# Patient Record
Sex: MALE | Race: WHITE
[De-identification: names, ages, dates, MRNs, and addresses within clinical notes are randomized per-mention and may not be internally consistent; named-entity substitution may affect disease eponyms.]

---

## 2019-04-28 ENCOUNTER — HOSPITAL ENCOUNTER (EMERGENCY)
Dept: HOSPITAL 10 - E/R | Age: 24
Discharge: HOME | End: 2019-04-28
Payer: SELF-PAY

## 2019-04-28 ENCOUNTER — HOSPITAL ENCOUNTER (EMERGENCY)
Dept: HOSPITAL 91 - E/R | Age: 24
Discharge: HOME | End: 2019-04-28
Payer: SELF-PAY

## 2019-04-28 VITALS
HEIGHT: 65 IN | HEIGHT: 65 IN | BODY MASS INDEX: 31.72 KG/M2 | WEIGHT: 190.39 LBS | WEIGHT: 190.39 LBS | BODY MASS INDEX: 31.72 KG/M2

## 2019-04-28 VITALS — HEART RATE: 65 BPM | RESPIRATION RATE: 16 BRPM | DIASTOLIC BLOOD PRESSURE: 60 MMHG | SYSTOLIC BLOOD PRESSURE: 123 MMHG
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
## 2019-04-28 DIAGNOSIS — S13.9XXA: ICD-10-CM
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
## 2019-04-28 DIAGNOSIS — R51: ICD-10-CM

## 2019-04-28 DIAGNOSIS — S00.83XA: ICD-10-CM

## 2019-04-28 DIAGNOSIS — S06.0X1A: Primary | ICD-10-CM

## 2019-04-28 LAB
ADD MAN DIFF?: NO
ALANINE AMINOTRANSFERASE: 29 IU/L (ref 13–69)
ALBUMIN/GLOBULIN RATIO: 1.43
ALBUMIN: 4.6 G/DL (ref 3.3–4.9)
ALKALINE PHOSPHATASE: 95 IU/L (ref 42–121)
ANION GAP: 9 (ref 5–13)
ASPARTATE AMINO TRANSFERASE: 30 IU/L (ref 15–46)
BASOPHIL #: 0 10^3/UL (ref 0–0.1)
BASOPHILS %: 0.3 % (ref 0–2)
BILIRUBIN,DIRECT: 0 MG/DL (ref 0–0.2)
BILIRUBIN,TOTAL: 0.5 MG/DL (ref 0.2–1.3)
BLOOD UREA NITROGEN: 13 MG/DL (ref 7–20)
CALCIUM: 9.8 MG/DL (ref 8.4–10.2)
CARBON DIOXIDE: 31 MMOL/L (ref 21–31)
CHLORIDE: 102 MMOL/L (ref 97–110)
CREATININE: 0.78 MG/DL (ref 0.61–1.24)
EOSINOPHILS #: 0.1 10^3/UL (ref 0–0.5)
EOSINOPHILS %: 2.4 % (ref 0–7)
GLOBULIN: 3.2 G/DL (ref 1.3–3.2)
GLUCOSE: 94 MG/DL (ref 70–220)
HEMATOCRIT: 45.2 % (ref 42–52)
HEMOGLOBIN: 15.7 G/DL (ref 14–18)
IMMATURE GRANS #M: 0.02 10^3/UL (ref 0–0.03)
IMMATURE GRANS % (M): 0.3 % (ref 0–0.43)
INR: 0.89
LYMPHOCYTES #: 1.6 10^3/UL (ref 0.8–2.9)
LYMPHOCYTES %: 27.9 % (ref 15–51)
MEAN CORPUSCULAR HEMOGLOBIN: 30 PG (ref 29–33)
MEAN CORPUSCULAR HGB CONC: 34.7 G/DL (ref 32–37)
MEAN CORPUSCULAR VOLUME: 86.3 FL (ref 82–101)
MEAN PLATELET VOLUME: 9.6 FL (ref 7.4–10.4)
MONOCYTE #: 0.5 10^3/UL (ref 0.3–0.9)
MONOCYTES %: 7.7 % (ref 0–11)
NEUTROPHIL #: 3.6 10^3/UL (ref 1.6–7.5)
NEUTROPHILS %: 61.4 % (ref 39–77)
NUCLEATED RED BLOOD CELLS #: 0 10^3/UL (ref 0–0)
NUCLEATED RED BLOOD CELLS%: 0 /100WBC (ref 0–0)
PARTIAL THROMBOPLASTIN TIME: 27.6 SEC (ref 23–35)
PLATELET COUNT: 230 10^3/UL (ref 140–415)
POTASSIUM: 4.2 MMOL/L (ref 3.5–5.1)
PROTIME: 12.2 SEC (ref 11.9–14.9)
PT RATIO: 1
RED BLOOD COUNT: 5.24 10^6/UL (ref 4.7–6.1)
RED CELL DISTRIBUTION WIDTH: 11.5 % (ref 11.5–14.5)
SODIUM: 142 MMOL/L (ref 135–144)
TOTAL PROTEIN: 7.8 G/DL (ref 6.1–8.1)
WHITE BLOOD COUNT: 5.9 10^3/UL (ref 4.8–10.8)

## 2019-04-28 PROCEDURE — 72125 CT NECK SPINE W/O DYE: CPT

## 2019-04-28 PROCEDURE — 70450 CT HEAD/BRAIN W/O DYE: CPT

## 2019-04-28 PROCEDURE — 96375 TX/PRO/DX INJ NEW DRUG ADDON: CPT

## 2019-04-28 PROCEDURE — 99285 EMERGENCY DEPT VISIT HI MDM: CPT

## 2019-04-28 PROCEDURE — 85610 PROTHROMBIN TIME: CPT

## 2019-04-28 PROCEDURE — 85025 COMPLETE CBC W/AUTO DIFF WBC: CPT

## 2019-04-28 PROCEDURE — 85730 THROMBOPLASTIN TIME PARTIAL: CPT

## 2019-04-28 PROCEDURE — 70486 CT MAXILLOFACIAL W/O DYE: CPT

## 2019-04-28 PROCEDURE — 96374 THER/PROPH/DIAG INJ IV PUSH: CPT

## 2019-04-28 PROCEDURE — 80053 COMPREHEN METABOLIC PANEL: CPT

## 2019-04-28 RX ADMIN — THIAMINE HYDROCHLORIDE 1 MLS/HR: 100 INJECTION, SOLUTION INTRAMUSCULAR; INTRAVENOUS at 13:27

## 2019-04-28 RX ADMIN — HYDROMORPHONE HYDROCHLORIDE 1 MG: 2 INJECTION, SOLUTION INTRAMUSCULAR; INTRAVENOUS; SUBCUTANEOUS at 13:27

## 2019-04-28 RX ADMIN — KETOROLAC TROMETHAMINE 1 MG: 30 INJECTION, SOLUTION INTRAMUSCULAR at 14:20

## 2019-04-28 RX ADMIN — ONDANSETRON HYDROCHLORIDE 1 MG: 2 INJECTION, SOLUTION INTRAMUSCULAR; INTRAVENOUS at 13:27

## 2019-04-28 NOTE — ERD
ER Documentation


Chief Complaint


Chief Complaint





HEAD INJURY DURING SOCCER GAME AFTER GETTING HIT WITH KNEE





HPI


This is a 23-year-old male with no past medical history that was brought into 


the emergency department by EMS after he experienced blunt head trauma just 


prior to arrival.  The patient was playing soccer and stated the last thing he 


remembers is diving to hit the ball.  EMS indicated the  stated the patient


had hit the left side of his head against another player.  He had a brief 


transient loss of consciousness for roughly 20 seconds.  The patient is 


complaining of a severe headache on the left side and pain over his left cheek. 


He denies any changes in vision.  He is not nauseous.  He did not experience any


emesis.  He states the headache is 10 out of 10 in intensity.  He denies any 


numbness or tingling of his upper or lower extremities.  He denies any abdominal


pain.





ROS


All systems reviewed and are negative except as per history of present illness.





Medications


Home Meds


Active Scripts


Hydrocodone/Acetaminophen (Norco 5-325 Tablet) 1 Each Tablet, 1 TAB PO Q6H PRN 


for PAIN, #20 TAB


   Prov:PERRI JO MD         4/28/19


Ibuprofen* (Motrin*) 800 Mg Tab, 800 MG PO Q6H PRN for PAIN AND OR ELEVATED 


TEMP, #30 TAB


   Prov:PERRI JO MD         4/28/19





Allergies


Allergies:  


Coded Allergies:  


     No Known Drug Allergies (Verified  Allergy, Unknown, 4/28/19)





PMhx/Soc


Medical and Surgical Hx:  pt denies Medical Hx, pt denies Surgical Hx


Smoking Status:  Never smoker





Physical Exam


Vitals





Vital Signs


  Date      Temp  Pulse  Resp  B/P (MAP)   Pulse Ox  O2          O2 Flow    FiO2


Time                                                 Delivery    Rate


   4/28/19  97.5     75    17      146/95        96


     13:03                          (112)





Physical Exam


Constitutional:Well-developed. Well-nourished.


HEENT:Normocephalic.  No nasal septal hematoma.  No hemotympanum.  Tenderness 


over the left psychometric arc.  No tenderness with movement of the extraocular 


muscles.  No subconjunctival hemorrhage.  No trismus.  No midface mobility.  No 


avulsions or fractures of the teeth.Pupils were equal round reactive to light. 


Moist mucous membranes.No tonsillar exudates.  Contusion over the lateral aspect


of the left zygomatic arc


Neck: No nuchal rigidity. No lymphadenopathy.  Posterior cervical spine t


enderness over C3-C4 with no step-offs


Respiratory: Not using accessory muscles of respiration.Lungs were clear to 


auscultation bilaterally. No rhonchi. No rales. No wheezing.  No crepitus no 


ecchymosis no flail chest.


Cardiovascular: Regular rate regular rhythm.No murmurs. No rubs were 


appreciated.S1, S2 normal. Distal pulses are palpable 2+ bilaterally.


GI: Abdomen was soft. Nontender. Non Distended. No pulsatile abdominal masses or


bruits. No rebound. No guarding. Bowel sounds were present and normal. 


Muscle skeletal: Full range of motion of both the upper and lower extremities 


bilaterally.Normal muscle tone.No assymetrical calf tenderness or swelling. 


Skin: No petechia, no purpura. No lesions on the palms or the soles of the feet.


No maculopapular rash.


NEURO: Patient was alert, awake, orientated x3.No facial droop. Gait observed 


and normal with no ataxia.Speech had regular rate and rhythm. No focal 


neurological deficits.


Result Diagram:  


4/28/19 1319                                                                    


           4/28/19 1319





Results 24 hrs





Laboratory Tests


              Test
                                 4/28/19
13:19


              White Blood Count                      5.9 10^3/ul


              Red Blood Count                       5.24 10^6/ul


              Hemoglobin                               15.7 g/dl


              Hematocrit                                  45.2 %


              Mean Corpuscular Volume                    86.3 fl


              Mean Corpuscular Hemoglobin                30.0 pg


              Mean Corpuscular Hemoglobin
Concent     34.7 g/dl 



              Red Cell Distribution Width                 11.5 %


              Platelet Count                         230 10^3/UL


              Mean Platelet Volume                        9.6 fl


              Immature Granulocytes %                    0.300 %


              Neutrophils %                               61.4 %


              Lymphocytes %                               27.9 %


              Monocytes %                                  7.7 %


              Eosinophils %                                2.4 %


              Basophils %                                  0.3 %


              Nucleated Red Blood Cells %            0.0 /100WBC


              Immature Granulocytes #              0.020 10^3/ul


              Neutrophils #                          3.6 10^3/ul


              Lymphocytes #                          1.6 10^3/ul


              Monocytes #                            0.5 10^3/ul


              Eosinophils #                          0.1 10^3/ul


              Basophils #                            0.0 10^3/ul


              Nucleated Red Blood Cells #            0.0 10^3/ul


              Prothrombin Time                          12.2 Sec


              Prothrombin Time Ratio                         1.0


              INR International Normalized
Ratio           0.89 



              Activated Partial
Thromboplast Time      27.6 Sec 



              Sodium Level                            142 mmol/L


              Potassium Level                         4.2 mmol/L


              Chloride Level                          102 mmol/L


              Carbon Dioxide Level                     31 mmol/L


              Anion Gap                                        9


              Blood Urea Nitrogen                       13 mg/dl


              Creatinine                              0.78 mg/dl


              Est Glomerular Filtrat Rate
mL/min   > 60 mL/min 



              Glucose Level                             94 mg/dl


              Calcium Level                            9.8 mg/dl


              Total Bilirubin                          0.5 mg/dl


              Direct Bilirubin                        0.00 mg/dl


              Indirect Bilirubin                       0.5 mg/dl


              Aspartate Amino Transf
(AST/SGOT)         30 IU/L 



              Alanine Aminotransferase
(ALT/SGPT)       29 IU/L 



              Alkaline Phosphatase                       95 IU/L


              Total Protein                             7.8 g/dl


              Albumin                                   4.6 g/dl


              Globulin                                 3.20 g/dl


              Albumin/Globulin Ratio                        1.43





Current Medications


 Medications
   Dose
          Sig/Percy
       Start Time
   Status  Last


 (Trade)       Ordered        Route
 PRN     Stop Time              Admin
Dose


                              Reason                                Admin


 Sodium         1,000 ml @ 
   Q1H STAT
      4/28/19       DC           4/28/19


Chloride       1,000 mls/hr   IV
            13:11
                       13:27



                                             4/28/19 14:10


                1 mg           ONCE  ONCE
    4/28/19       DC           4/28/19


Hydromorphone                 IV
            13:30
                       13:27



HCl
                                         4/28/19 13:31


(Dilaudid)


 Ondansetron    4 mg           ONCE  ONCE
    4/28/19       DC           4/28/19


HCl
  (Zofran                 IV
            13:30
                       13:27



Inj)                                         4/28/19 13:31


 Ketorolac
     30 mg          ONCE  STAT
    4/28/19       DC           4/28/19


Tromethamine
                 IV
            14:07
                       14:20



 (Toradol)                                   4/28/19 14:09








Procedures/MDM


Is a 23-year-old male that presented to the emergency department with blunt head


trauma with loss of consciousness.  The patient was immediately placed on a 


cardiac monitor continuous pulse oximetry and IV access was established by 


nursing to.  The patient was in a significant amount of discomfort was given 


intravenous morphine and Zofran for analgesic control.  The patient was placed 


in a c-collar for C-spine immobilization.  The patient had a CT scan of his head


neck and face.  There was no fractures, no intercerebral hemorrhage and no mass-


effect.  I indicated to the patient I felt his symptoms were result of a 


concussion and facial contusion.  The patient was also given Toradol.  His pain 


improved and he felt comfortable being discharged home.  Close head injuries 


were provided to the patient.  The patient was discharged home in fair 


condition. They were instructed to return to the emergency department at any 


time if there was any worsening of their condition. The patient stated they 


would follow up with their PCP in the next 24-48 hours to initiate a suitable 


medication regimen under the care of their PCP as well as to allow their PCP to 


monitor any drug reactions. The patient was discharged home with prescriptions 


after they gave informed consent to the new medication.  They were also fully 


informed by myself on the adverse effects and adverse drug interactions in order


to provide adequate safeguards to prevent possible adverse reactions to 


medications.





Departure


Diagnosis:  


   Primary Impression:  


   Concussion


   Encounter type:  initial encounter  Loss of consciousness presence/duration: 


   with LOC of 30 min or less  Qualified Codes:  S06.0X1A - Concussion with loss


   of consciousness of 30 minutes or less, initial encounter


   Additional Impressions:  


   Closed head injury


   Encounter type:  initial encounter  Qualified Codes:  S09.90XA - Unspecified 


   injury of head, initial encounter


   Sprain of cervical neck


   Encounter type:  initial encounter  Qualified Codes:  S13.9XXA - Sprain of 


   joints and ligaments of unspecified parts of neck, initial encounter


   Facial contusion


   Encounter type:  initial encounter  Qualified Codes:  S00.83XA - Contusion of


   other part of head, initial encounter


Condition:  Fair


Patient Instructions:  Concussion, No Wake Up, Facial Contusion, No Wakeup











PERRI JO MD            Apr 28, 2019 15:22